# Patient Record
Sex: FEMALE | Race: WHITE | URBAN - METROPOLITAN AREA
[De-identification: names, ages, dates, MRNs, and addresses within clinical notes are randomized per-mention and may not be internally consistent; named-entity substitution may affect disease eponyms.]

---

## 2017-03-25 ENCOUNTER — APPOINTMENT (OUTPATIENT)
Dept: GENERAL RADIOLOGY | Facility: HOSPITAL | Age: 52
End: 2017-03-25
Attending: EMERGENCY MEDICINE
Payer: MEDICAID

## 2017-03-25 PROCEDURE — 71020 XR CHEST PA + LAT CHEST (CPT=71020): CPT

## 2017-03-25 PROCEDURE — 72040 X-RAY EXAM NECK SPINE 2-3 VW: CPT

## 2017-03-25 PROCEDURE — 72050 X-RAY EXAM NECK SPINE 4/5VWS: CPT

## 2017-03-25 PROCEDURE — 73090 X-RAY EXAM OF FOREARM: CPT

## 2017-03-25 PROCEDURE — 71010 XR CHEST AP PORTABLE  (CPT=71010): CPT

## 2017-03-25 NOTE — ED NOTES
Pt attempting to jump off the bed and verbally abusive with staff. Security and Starlett Oelrichs charge Rn at bedside, pt not listening to directions and was screaming. Became combative and want to leave.

## 2017-03-25 NOTE — ED NOTES
Patient provided a name of aliya to call for a ride  [phone]  Informed aliya that the patient needed a sober  to drive her home  Mary Kay Manzano stated that him and his friends at his house are not able to drive due to [employer] kayy Mijares was n

## 2017-03-25 NOTE — ED NOTES
Per PCT Pauline sister Kristenabimbola Whitfield who called back sister and brother \"want nothing to do with pt\", will not pick her up, Dionna Mean is the boyfriend, and that pt correct name is Julio Tena.
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
## 2017-03-25 NOTE — ED NOTES
All of the patients belongings are removed  1 pair of pants  1 tank top  1 pair of socks  1 pair of underwear  1 hoodie  No cell phone or wallet or purse    All the belongings were placed into a patient belonging bag and put into the bin behind pod a nurse
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
## 2017-03-26 NOTE — ED NOTES
Assumed care of pt at this time. Pt resting on cart. Pt requesting to go to restroom, provided with bedpan. Pt arms repositioned, restraints changed. Pt given orange juice.  TV on and lights dimmed per pt request. Pt reinformed that may not leave ED at this

## 2017-03-26 NOTE — ED NOTES
Pt given 2 more juice as requested, pt discharged home. Pt given opportunity to receive help for alcohol problem pt declined, pt again using abusive language with staff.

## 2017-03-26 NOTE — ED NOTES
Spoke with pt's son John Cerna, [phone]. Unable to come to pick her Mother up but will work on finding someone to come. Will call back. Dr. Can Alert aware.

## (undated) NOTE — ED AVS SNAPSHOT
[facility] Emergency Department    [place]  [address] [address]    Phone:  [phone]    Fax:  [phone]   MRN: [id number]    Department:  [facility] Emergency Department   Date of Visit:  3/25 covered by your plan. Please contact your insurance company to determine coverage for follow-up care and referrals.     [address] [place] [phone]  Pediatric [phone] Emergency Department   [phone]       To by a radiologist.  If there is a significant change in your reading, you will be contacted. Please make sure we have your correct phone number before you leave. After you leave, you should follow the attached instructions.      I have read and understand th [facility]. Imaging Results         XR FOREARM (2 VIEWS), LEFT (CPT=73090) (Final result) Result time:  03/25/17 17:03:11    Final result    Impression:    CONCLUSION:  No acute fractures.            Dictated by: Lenny Gao MD on 3/2 No acute fractures or osseous lesions are identified. No prevertebral soft tissue swelling. Degenerative disc   disease.                   XR CHEST AP PORTABLE  (CPT=71010) (Final result) Result time:  03/25/17 17:02:13    Procedure changed from XR CHEST P

## (undated) NOTE — ED AVS SNAPSHOT
[facility] Emergency Department    [place]  [address] [address]    Phone:  [phone]    Fax:  Juni Dash   MRN: [id number]    Department:  [facility] Emergency Department   Date of Visit:  3/25 IF THERE IS ANY CHANGE OR WORSENING OF YOUR CONDITION, CALL YOUR PRIMARY CARE PHYSICIAN AT ONCE OR RETURN IMMEDIATELY TO THE EMERGENCY DEPARTMENT.     If you have been prescribed any medication(s), please fill your prescription right away and begin taking t